# Patient Record
(demographics unavailable — no encounter records)

---

## 2024-10-29 NOTE — CONSULT LETTER
[Dear  ___] : Dear  [unfilled], [Consult Letter:] : I had the pleasure of evaluating your patient, [unfilled]. [Please see my note below.] : Please see my note below. [Consult Closing:] : Thank you very much for allowing me to participate in the care of this patient.  If you have any questions, please do not hesitate to contact me. [Sincerely,] : Sincerely, [FreeTextEntry3] : Kenney Multani MD, FCCP, D. [facility] Pulmonary and Sleep Medicine [facility] at [place]

## 2024-10-29 NOTE — END OF VISIT
[Time Spent: ___ minutes] : I have spent [unfilled] minutes of time on the encounter which excludes teaching and separately reported services. [TextEntry] : Discussed with pt at length regarding ALMAZ, obesity, prior Covid infection, soboe; reviewed w/u with pt as above.

## 2024-10-29 NOTE — REASON FOR VISIT
[Follow-Up] : a follow-up visit [Sleep Apnea] : sleep apnea [Shortness of Breath] : shortness of breath [Obesity] : obesity [Ad Hoc ] : provided by an ad hoc  [TextBox_44] : Prior Covid infection. [Interpreters_FullName] : Richy [Interpreters_Relationshiptopatient] : MA [TWNoteComboBox1] : [place]

## 2024-10-29 NOTE — REVIEW OF SYSTEMS
[Fatigue] : fatigue [SOB on Exertion] : sob on exertion [Seasonal Allergies] : seasonal allergies [Back Pain] : back pain [Obesity] : obesity [Negative] : Psychiatric [TextBox_44] : Brugada syndrome

## 2024-10-29 NOTE — RESULTS/DATA
[TextEntry] : Home PSG from 3/29/24 revealed severe ALMAZ with an AHI of 49.8 Pt not compliant with PAP therapy. Adjusted to 7-16 cm of water but still not compliant. CXR from 10/1/24 was normal.

## 2024-10-29 NOTE — HISTORY OF PRESENT ILLNESS
[Never] : never [Excessive Daytime Sleepiness] : excessive daytime sleepiness [Witnessed Gasping During Sleep] : witnessed gasping during sleep [Snoring] : snoring [Unrefreshing Sleep] : unrefreshing sleep [Sleepy When Sedentary] : sleepy when sedentary [None] : No associated symptoms are reported [Excess Weight] : excess weight [Currently Experiencing] : The patient is currently experiencing symptoms. [Dyspnea] : dyspnea [Knee Pain] : knee pain [Back Pain] : back pain [Low Calorie Diet] : low calorie diet [Fair Compliance] : fair compliance with treatment [Fair Tolerance] : fair tolerance of treatment [Sleep Apnea] : sleep apnea [CPAP] : CPAP [Poor Compliance] : poor compliance with treatment [Poor Tolerance] : poor tolerance of treatment [Poor Symptom Control] : poor symptom control [Follow-Up - Routine Clinic] : a routine clinic follow-up of [TextBox_4] : Never smoker. S/p Covid infection with mild symptoms and did not require therapy. Pt denies any h/o asthma, COPD or significant smoking hx.  Patient c/o SOBOE but is otherwise without associated respiratory complaints. Pt is not compliant with PAP therapy for her severe ALMAZ, but she is willing to try again. [ESS] : 11 [Witnessed Apnea During Sleep] : no witnessed apnea during sleep

## 2024-10-29 NOTE — DISCUSSION/SUMMARY
[FreeTextEntry1] : #1. Bony from 8/27/24 was normal. #2. The patient does not appear to require chronic BD therapy at this time. #3. Diet and exercise for weight loss. #4. SOBOE is likely at least somewhat related to weight or deconditioning.  #5. CXR to evaluate SOBOE was clear on 10/1/24. #6. Resume autoCPAP to treat severe ALMAZ with an AHI of 49.8; encouraged at least 70% compliance. #7. Replace PAP equipment as needed; ordered 6/7/24. Pt currently not compliant. Changed pressure to 7-16 cm of water and reviewed mask fitting with pt previously. Consider a titration study if pt unable to tolerate PAP therapy. #8. S/p Covid infection. #9. F/u in 2 months with compliance on new pressures (7-16).  The patient expressed understanding and agreement with the above recommendations/plan and accepts responsibility to be compliant with recommended testing, therapies, and f/u visits. All relevant questions and concerns were addressed.

## 2025-01-06 NOTE — PAST MEDICAL HISTORY
[Postmenopausal] : postmenopausal [Menarche Age ____] : age at menarche was [unfilled] [Menopause Age____] : age at menopause was [unfilled] [Total Preg ___] : G[unfilled] [Live Births ___] : P[unfilled]  [Abortions ___] : Abortions:[unfilled] [AB Spont ___] : miscarriages: [unfilled]

## 2025-01-06 NOTE — ASSESSMENT
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  Physical exam normal aside from Obesity  GENERAL:  -Fasting blood work in house -Urine for drug testing -Mammogram referral provided -Bone density 1/2023, normal -Colonoscopy completed 1/23/24, normal, repeat in 5 years -Flu and Covid vaccines declined -Sleep studies to r/o Sleep Apnea  -Forms to be filled out when blood work results are completed.

## 2025-01-06 NOTE — HEALTH RISK ASSESSMENT
[1 or 2 (0 pts)] : 1 or 2 (0 points) [Never (0 pts)] : Never (0 points) [No] : In the past 12 months have you used drugs other than those required for medical reasons? No [No falls in past year] : Patient reported no falls in the past year [0] : 2) Feeling down, depressed, or hopeless: Not at all (0) [PHQ-2 Negative - No further assessment needed] : PHQ-2 Negative - No further assessment needed [Never] : Never [de-identified] : none [de-identified] : regular [[id number]] : 0

## 2025-01-06 NOTE — HISTORY OF PRESENT ILLNESS
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

## 2025-01-07 NOTE — RESULTS/DATA
No
[TextEntry] : Home PSG from 3/29/24 revealed severe ALMAZ with an AHI of 49.8 Pt not compliant with PAP therapy. Adjusted to 7-16 cm of water but still not compliant. CXR from 10/1/24 was normal.

## 2025-01-07 NOTE — REASON FOR VISIT
[Follow-Up] : a follow-up visit [Sleep Apnea] : sleep apnea [Shortness of Breath] : shortness of breath [Obesity] : obesity [Language Line ] : provided by [employer]   [TextBox_44] : Prior Covid infection. [Interpreters_IDNumber] : [id number] [Interpreters_FullName] : Isabelle [TWNoteComboBox1] : American

## 2025-01-07 NOTE — HISTORY OF PRESENT ILLNESS
[Never] : never [Excessive Daytime Sleepiness] : excessive daytime sleepiness [Witnessed Gasping During Sleep] : witnessed gasping during sleep [Snoring] : snoring [Unrefreshing Sleep] : unrefreshing sleep [Sleepy When Sedentary] : sleepy when sedentary [None] : No associated symptoms are reported [CPAP] : CPAP [Poor Compliance] : poor compliance with treatment [Poor Tolerance] : poor tolerance of treatment [Follow-Up - Routine Clinic] : a routine clinic follow-up of [Excess Weight] : excess weight [Currently Experiencing] : The patient is currently experiencing symptoms. [Dyspnea] : dyspnea [Knee Pain] : knee pain [Back Pain] : back pain [Low Calorie Diet] : low calorie diet [Fair Compliance] : fair compliance with treatment [Fair Tolerance] : fair tolerance of treatment [Poor Symptom Control] : poor symptom control [Sleep Apnea] : sleep apnea [TextBox_4] : Never smoker. S/p Covid infection with mild symptoms and did not require therapy. Pt denies any h/o asthma, COPD or significant smoking hx.  Patient c/o SOBOE but is otherwise without associated respiratory complaints. Pt is not compliant with PAP therapy for her severe ALMAZ, but she is willing to try again. She reports that the machine is not working properly and turns off for no reason.  Consider titration study if pt continues to have issues. [ESS] : 11 [Witnessed Apnea During Sleep] : no witnessed apnea during sleep

## 2025-01-07 NOTE — DISCUSSION/SUMMARY
[FreeTextEntry1] : #1. Bony from 8/27/24 was normal. #2. The patient does not appear to require chronic BD therapy at this time. #3. Diet and exercise for weight loss. #4. SOBOE is likely at least somewhat related to weight or deconditioning.  #5. CXR to evaluate SOBOE was clear on 10/1/24. #6. Resume autoCPAP to treat severe ALMAZ with an AHI of 49.8; encouraged at least 70% compliance. #7. Replace PAP equipment as needed; ordered 6/7/24. Pt currently not compliant. Changed pressure to 7-16 cm of water and reviewed mask fitting with pt previously. Consider a titration study if pt unable to tolerate PAP therapy. #8. S/p Covid infection. #9. F/u in 2 months with compliance on new pressures (7-16) if machine is working properly.  The patient expressed understanding and agreement with the above recommendations/plan and accepts responsibility to be compliant with recommended testing, therapies, and f/u visits. All relevant questions and concerns were addressed.

## 2025-01-07 NOTE — CONSULT LETTER
[Dear  ___] : Dear  [unfilled], [Consult Letter:] : I had the pleasure of evaluating your patient, [unfilled]. [Please see my note below.] : Please see my note below. [Consult Closing:] : Thank you very much for allowing me to participate in the care of this patient.  If you have any questions, please do not hesitate to contact me. [Sincerely,] : Sincerely, [FreeTextEntry3] : Kenney Multani MD, FCCP, D. ABSM Pulmonary and Sleep Medicine [facility] at [place]

## 2025-01-13 NOTE — HISTORY OF PRESENT ILLNESS
[FreeTextEntry1] : Sonya is a very pleasant 60-year-old female presenting today with a somewhat Unclear history of right wrist pain.  Patient states that she did suffer a fall on September 13, 2024.  She states that she was at an event and subsequently did not go to urgent care until the following day due to severe swelling and pain.  Subsequent to visiting the emergency room and she did not seek care from a hand surgeon.

## 2025-01-13 NOTE — PHYSICAL EXAM
[de-identified] : Examination of the [right] wrist reveals mild effusion with tenderness at radiocarpal juncture. No signs of infection.  I do not feel instability.  Range of motion is flexion 40, extension 40 Examination at the level of the [left] wrist reveals tenderness at the level of the first dorsal compartment with a positive finkelstein. Examination of the left thumb basal joint reveals pain with compression of the CMC joint with a positive grind test for pain.  [Adjacent thumb MCP joint is stable without hyperextension] [de-identified] : [4] views of [bilateral hands and wrists] were obtained today in my office and were seen by me and discussed with the patient.  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  We have discussed midcarpal arthropathy as well.  We have discussed morphology of right wrist joint.  We have discussed likely distal radius styloid fracture with minimal displacement which is healing.  We have discussed possibility of a Robles styloid perilunate injury which is chronic at this stage.

## 2025-01-13 NOTE — ASSESSMENT
[FreeTextEntry1] : ASSESSMENT: The patient comes in today with chronic exacerbated complaints of basal joint arthropathy and tendinosis.  We have also discussed her injury back in September with somewhat unclear history.  We have discussed findings on imaging thoroughly and we have discussed treatment options.  At this stage she does elect for nonoperative care.  We have discussed bracing and activity modification.  She does elect for injections   The patient was adequately and thoroughly informed of my assessment of their current condition(s).  - This may diminish bodily function for the extremity. We discussed prognosis, tx modalities including operative and nonoperative options for the above diagnostic assessment. As always, 2nd opinion is always provided as an option.  When accessible, I was able to review other physicians note(s) including reviewing other tests, imaging results as well as personally view these results for my own interpretation.   Injection:   The risks and benefits of a steroid injection were discussed in detail. The risks include but are not limited to: pain, infection, swelling, flare response, bleeding, subcutaneous fat atrophy, skin depigmentation and/or elevation of blood sugar. The risk of incomplete resolution of symptoms, recurrence and additional intervention was reviewed and considered by the patient. The patient agreed to proceed and under a sterile prep, I injected 1 unit 6mg into 1 cc of a combination of Celestone and sterile saline into the right first dorsal compartment, right wrist joint. The patient tolerated the injection well.  The patient was adequately and thoroughly informed of my assessment of their current condition(s).  DISCUSSION: 1.  Injections as above.  Bracing activity modification.  Follow-up 3 months 2.  Allergy to lidocaine and epinephrine she states 3. [x]

## 2025-02-14 NOTE — HISTORY OF PRESENT ILLNESS
[FreeTextEntry1] : Physical exam. [de-identified] : 59 y/o female with PMHx significant for Brugada Syndrome s/p ILR seen by Dr Perez, fall s/p RIGHT wrist fracture presenting for CPE.

## 2025-02-14 NOTE — ASSESSMENT
[FreeTextEntry1] : 58 y/o female with PMHx significant for Brugada Syndrome s/p ILR seen by Dr Pierce, RIGHT wrist arthropathy s/p fall presenting for CPE   MSK: h/o Wrist arthropathy, back pain -Continue Cyclobenzaprine 5 mg at bedtime prn -Ortho Dr Ariza following for wrist arthropathy, s/p injection  ENT: Nasal congestion -Azelastine NS  GENERAL:  -Blood work as outpt -Mammogram referral provided -Bone density 1/2023, normal, referral provided -Colonoscopy completed 1/23/24, normal, repeat in 5 years (2029) -Flu and Covid vaccines declined

## 2025-02-14 NOTE — PHYSICAL EXAM
[No Acute Distress] : no acute distress [Well Nourished] : well nourished [Well Developed] : well developed [Well-Appearing] : well-appearing [Normal Sclera/Conjunctiva] : normal sclera/conjunctiva [PERRL] : pupils equal round and reactive to light [EOMI] : extraocular movements intact [Normal Outer Ear/Nose] : the outer ears and nose were normal in appearance [Normal Oropharynx] : the oropharynx was normal [No JVD] : no jugular venous distention [No Lymphadenopathy] : no lymphadenopathy [Supple] : supple [Thyroid Normal, No Nodules] : the thyroid was normal and there were no nodules present [No Respiratory Distress] : no respiratory distress  [No Accessory Muscle Use] : no accessory muscle use [Clear to Auscultation] : lungs were clear to auscultation bilaterally [Normal Rate] : normal rate  [Regular Rhythm] : with a regular rhythm [Normal S1, S2] : normal S1 and S2 [No Murmur] : no murmur heard [No Carotid Bruits] : no carotid bruits [No Abdominal Bruit] : a ~M bruit was not heard ~T in the abdomen [No Varicosities] : no varicosities [Pedal Pulses Present] : the pedal pulses are present [No Edema] : there was no peripheral edema [No Palpable Aorta] : no palpable aorta [No Extremity Clubbing/Cyanosis] : no extremity clubbing/cyanosis [Soft] : abdomen soft [Non Tender] : non-tender [Non-distended] : non-distended [No Masses] : no abdominal mass palpated [No HSM] : no HSM [Normal Bowel Sounds] : normal bowel sounds [Normal Posterior Cervical Nodes] : no posterior cervical lymphadenopathy [Normal Anterior Cervical Nodes] : no anterior cervical lymphadenopathy [No CVA Tenderness] : no CVA  tenderness [No Spinal Tenderness] : no spinal tenderness [No Joint Swelling] : no joint swelling [Grossly Normal Strength/Tone] : grossly normal strength/tone [No Rash] : no rash [Coordination Grossly Intact] : coordination grossly intact [No Focal Deficits] : no focal deficits [Normal Gait] : normal gait [Deep Tendon Reflexes (DTR)] : deep tendon reflexes were 2+ and symmetric [Normal Affect] : the affect was normal [Normal Insight/Judgement] : insight and judgment were intact [de-identified] : RIGHT wrist with brace in place, TTP, decreased ROM, decreased strength to  3 of 4

## 2025-02-14 NOTE — COUNSELING
[Fall prevention counseling provided] : Fall prevention counseling provided [Behavioral health counseling provided] : Behavioral health counseling provided [AUDIT-C Screening administered and reviewed] : AUDIT-C Screening administered and reviewed [Benefits of weight loss discussed] : Benefits of weight loss discussed [Encouraged to increase physical activity] : Encouraged to increase physical activity [____ min/wk Activity] : [unfilled] min/wk activity [None] : None [Good understanding] : Patient has a good understanding of lifestyle changes and steps needed to achieve self management goal

## 2025-02-14 NOTE — HEALTH RISK ASSESSMENT
[Good] : ~his/her~  mood as  good [1 or 2 (0 pts)] : 1 or 2 (0 points) [Never (0 pts)] : Never (0 points) [No] : In the past 12 months have you used drugs other than those required for medical reasons? No [No falls in past year] : Patient reported no falls in the past year [0] : 2) Feeling down, depressed, or hopeless: Not at all (0) [PHQ-2 Negative - No further assessment needed] : PHQ-2 Negative - No further assessment needed [Never] : Never [Patient reported PAP Smear was normal] : Patient reported PAP Smear was normal [HIV test declined] : HIV test declined [Hepatitis C test declined] : Hepatitis C test declined [None] : None [With Family] : lives with family [# of Members in Household ___] :  household currently consist of [unfilled] member(s) [Employed] : employed [] :  [Sexually Active] : sexually active [Feels Safe at Home] : Feels safe at home [Fully functional (bathing, dressing, toileting, transferring, walking, feeding)] : Fully functional (bathing, dressing, toileting, transferring, walking, feeding) [Fully functional (using the telephone, shopping, preparing meals, housekeeping, doing laundry, using] : Fully functional and needs no help or supervision to perform IADLs (using the telephone, shopping, preparing meals, housekeeping, doing laundry, using transportation, managing medications and managing finances) [Reports changes in vision] : Reports changes in vision [Smoke Detector] : smoke detector [Carbon Monoxide Detector] : carbon monoxide detector [Seat Belt] :  uses seat belt [Sunscreen] : uses sunscreen [With Patient/Caregiver] : , with patient/caregiver [Reviewed updated] : Reviewed, updated [Aggressive treatment] : aggressive treatment [de-identified] : none [de-identified] : regular [[id number]] : 0 [NO] : No [Change in mental status noted] : No change in mental status noted [Language] : denies difficulty with language [Behavior] : denies difficulty with behavior [Learning/Retaining New Information] : denies difficulty learning/retaining new information [Handling Complex Tasks] : denies difficulty handling complex tasks [Reasoning] : denies difficulty with reasoning [Spatial Ability and Orientation] : denies difficulty with spatial ability and orientation [High Risk Behavior] : no high risk behavior [Reports changes in hearing] : Reports no changes in hearing [Safety elements used in home] : no safety elements used in home [MammogramDate] : 03/23 [PapSmearDate] : 11/23 [BoneDensityDate] : 01/23 [BoneDensityComments] : Normal [ColonoscopyDate] : 01/24 [ColonoscopyComments] : Normal, repeat in 5 years for fam h/o Colon CA [FreeTextEntry2] : BENJAMIN [AdvancecareDate] : 02/25

## 2025-04-15 NOTE — ASSESSMENT
[FreeTextEntry1] : ASSESSMENT: The patient comes in today with chronic exacerbated complaints of basal joint arthropathy and tendinosis.  We have also discussed her injury back in September with somewhat unclear history.  We have discussed findings on imaging thoroughly and we have discussed treatment options.  At this stage she does elect for nonoperative care once again.  We have discussed bracing and activity modification.  She does elect for injections today as she is seeking relief.   The patient was adequately and thoroughly informed of my assessment of their current condition(s).  - This may diminish bodily function for the extremity. We discussed prognosis, tx modalities including operative and nonoperative options for the above diagnostic assessment. As always, 2nd opinion is always provided as an option.  When accessible, I was able to review other physicians note(s) including reviewing other tests, imaging results as well as personally view these results for my own interpretation.   Injection procedure for right wrist PIN nerve tunnel:   The risks and benefits of a steroid injection were discussed in detail. The risks include but are not limited to: pain, infection, swelling, flare response, bleeding, subcutaneous fat atrophy, skin depigmentation and/or elevation of blood sugar. The risk of incomplete resolution of symptoms, recurrence and additional intervention was reviewed and considered by the patient. The patient agreed to proceed and under a sterile prep, I injected 1 unit 6mg into 1 cc of a combination of Celestone and sterile saline into the the above-stated location. The patient tolerated the injection well.  Injection procedure for left wrist joint:   The risks and benefits of a steroid injection were discussed in detail. The risks include but are not limited to: pain, infection, swelling, flare response, bleeding, subcutaneous fat atrophy, skin depigmentation and/or elevation of blood sugar. The risk of incomplete resolution of symptoms, recurrence and additional intervention was reviewed and considered by the patient. The patient agreed to proceed and under a sterile prep, I injected 1 unit 6mg into 1 cc of a combination of Celestone and sterile saline into the above stated location for the procedure. The patient tolerated the injection well.  The patient was adequately and thoroughly informed of my assessment of their current condition(s).  DISCUSSION: 1.  Injections as above.  Bracing activity modification.  Follow-up 3 months 2.  Allergy to lidocaine and epinephrine she states -cardiac syndrome history 3. [x]

## 2025-04-15 NOTE — PHYSICAL EXAM
[de-identified] : Examination of the [bilateral] wrist reveals mild effusion with tenderness at radiocarpal juncture. No signs of infection.  I do not feel instability.  Range of motion is flexion 40, extension 40 Examination at the level of the [left] wrist reveals tenderness at the level of the first dorsal compartment with a positive finkelstein. Examination of the left thumb basal joint reveals pain with compression of the CMC joint with a positive grind test for pain.  [Adjacent thumb MCP joint is stable without hyperextension] [de-identified] : [4] views of [bilateral hands and wrists] were reviewed today in my office and were seen by me and discussed with the patient.  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  We have discussed midcarpal arthropathy as well.  We have discussed morphology of right wrist joint.  We have discussed likely distal radius styloid fracture with minimal displacement which is healing.  We have discussed possibility of a Robles styloid perilunate injury which is chronic at this stage.

## 2025-04-15 NOTE — HISTORY OF PRESENT ILLNESS
[FreeTextEntry1] : Sonya is a very pleasant 60-year-old female presenting today with a somewhat Unclear history of right wrist pain.  Patient states that she did suffer a fall on September 13, 2024.  She states that she was at an event and subsequently did not go to urgent care until the following day due to severe swelling and pain.  Subsequent to visiting the emergency room and she did not seek care from a hand surgeon. States prior injections were very helpful

## 2025-07-22 NOTE — PHYSICAL EXAM
[de-identified] : Examination of the [bilateral] wrist reveals mild effusion with tenderness at radiocarpal juncture. No signs of infection.  I do not feel instability.  Range of motion is flexion 40, extension 40 Examination at the level of the [left] wrist reveals tenderness at the level of the first dorsal compartment with a positive finkelstein. Examination of the left thumb basal joint reveals pain with compression of the CMC joint with a positive grind test for pain.  [Adjacent thumb MCP joint is stable without hyperextension] [de-identified] : [4] views of [bilateral hands and wrists] were reviewed today in my office and were seen by me and discussed with the patient.  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  We have discussed midcarpal arthropathy as well.  We have discussed morphology of right wrist joint.  We have discussed likely distal radius styloid fracture with minimal displacement which is healing.  We have discussed possibility of a Robles styloid perilunate injury which is chronic at this stage.

## 2025-07-22 NOTE — ASSESSMENT
[FreeTextEntry1] : ASSESSMENT: The patient comes in today with chronic exacerbated complaints of basal joint arthropathy and tendinosis.  We have also discussed her injury back in September with somewhat unclear history.  We have discussed findings on imaging thoroughly and we have discussed treatment options.  At this stage she does elect for nonoperative care once again.  We have discussed bracing and activity modification.  She does elect for injections today as she is seeking relief. They have been helpful    The patient was adequately and thoroughly informed of my assessment of their current condition(s).  - This may diminish bodily function for the extremity. We discussed prognosis, tx modalities including operative and nonoperative options for the above diagnostic assessment. As always, 2nd opinion is always provided as an option.  When accessible, I was able to review other physicians note(s) including reviewing other tests, imaging results as well as personally view these results for my own interpretation.   Injection procedure for right wrist PIN nerve tunnel:   The risks and benefits of a steroid injection were discussed in detail. The risks include but are not limited to: pain, infection, swelling, flare response, bleeding, subcutaneous fat atrophy, skin depigmentation and/or elevation of blood sugar. The risk of incomplete resolution of symptoms, recurrence and additional intervention was reviewed and considered by the patient. The patient agreed to proceed and under a sterile prep, I injected 1 unit 6mg into 1 cc of a combination of Celestone and sterile saline into the the above-stated location. The patient tolerated the injection well.  Injection procedure for right wrist joint:   The risks and benefits of a steroid injection were discussed in detail. The risks include but are not limited to: pain, infection, swelling, flare response, bleeding, subcutaneous fat atrophy, skin depigmentation and/or elevation of blood sugar. The risk of incomplete resolution of symptoms, recurrence and additional intervention was reviewed and considered by the patient. The patient agreed to proceed and under a sterile prep, I injected 1 unit 6mg into 1 cc of a combination of Celestone and sterile saline into the above stated location for the procedure. The patient tolerated the injection well.  The patient was adequately and thoroughly informed of my assessment of their current condition(s).  DISCUSSION: 1.  Injections as above.  Bracing activity modification.  Follow-up 3 months 2.  Allergy to lidocaine and epinephrine she states -cardiac syndrome history 3.  We have discussed wrist arthroplasty type surgical procedures for her condition